# Patient Record
Sex: FEMALE | Race: WHITE | Employment: OTHER | URBAN - NONMETROPOLITAN AREA
[De-identification: names, ages, dates, MRNs, and addresses within clinical notes are randomized per-mention and may not be internally consistent; named-entity substitution may affect disease eponyms.]

---

## 2021-01-17 PROBLEM — S12.190A COMPRESSION FRACTURE OF SECOND CERVICAL VERTEBRA (HCC): Status: ACTIVE | Noted: 2021-01-17

## 2021-01-18 ENCOUNTER — HOSPITAL ENCOUNTER (INPATIENT)
Age: 86
LOS: 2 days | Discharge: NURSING, REHAB OR LONG-TERM CARE | DRG: 552 | End: 2021-01-20
Attending: ORTHOPAEDIC SURGERY | Admitting: ORTHOPAEDIC SURGERY
Payer: MEDICARE

## 2021-01-18 DIAGNOSIS — S12.190A COMPRESSION FRACTURE OF C2 VERTEBRA, INITIAL ENCOUNTER (HCC): Primary | ICD-10-CM

## 2021-01-18 LAB
ANION GAP SERPL CALCULATED.3IONS-SCNC: 11 MEQ/L (ref 8–16)
BUN BLDV-MCNC: 23 MG/DL (ref 7–22)
CALCIUM SERPL-MCNC: 8.8 MG/DL (ref 8.5–10.5)
CHLORIDE BLD-SCNC: 108 MEQ/L (ref 98–111)
CO2: 28 MEQ/L (ref 23–33)
CREAT SERPL-MCNC: 0.5 MG/DL (ref 0.4–1.2)
GFR SERPL CREATININE-BSD FRML MDRD: > 90 ML/MIN/1.73M2
GLUCOSE BLD-MCNC: 91 MG/DL (ref 70–108)
POTASSIUM REFLEX MAGNESIUM: 3.8 MEQ/L (ref 3.5–5.2)
SODIUM BLD-SCNC: 147 MEQ/L (ref 135–145)

## 2021-01-18 PROCEDURE — 2700000000 HC OXYGEN THERAPY PER DAY

## 2021-01-18 PROCEDURE — 51798 US URINE CAPACITY MEASURE: CPT

## 2021-01-18 PROCEDURE — 2580000003 HC RX 258: Performed by: PHYSICIAN ASSISTANT

## 2021-01-18 PROCEDURE — 6370000000 HC RX 637 (ALT 250 FOR IP): Performed by: PHYSICIAN ASSISTANT

## 2021-01-18 PROCEDURE — 80048 BASIC METABOLIC PNL TOTAL CA: CPT

## 2021-01-18 PROCEDURE — 94760 N-INVAS EAR/PLS OXIMETRY 1: CPT

## 2021-01-18 PROCEDURE — 1200000000 HC SEMI PRIVATE

## 2021-01-18 PROCEDURE — 99221 1ST HOSP IP/OBS SF/LOW 40: CPT | Performed by: PHYSICIAN ASSISTANT

## 2021-01-18 PROCEDURE — 36415 COLL VENOUS BLD VENIPUNCTURE: CPT

## 2021-01-18 RX ORDER — SODIUM CHLORIDE 0.9 % (FLUSH) 0.9 %
10 SYRINGE (ML) INJECTION EVERY 12 HOURS SCHEDULED
Status: DISCONTINUED | OUTPATIENT
Start: 2021-01-18 | End: 2021-01-20 | Stop reason: HOSPADM

## 2021-01-18 RX ORDER — HYDRALAZINE HYDROCHLORIDE 25 MG/1
25 TABLET, FILM COATED ORAL EVERY 6 HOURS PRN
Status: DISCONTINUED | OUTPATIENT
Start: 2021-01-18 | End: 2021-01-20 | Stop reason: HOSPADM

## 2021-01-18 RX ORDER — SODIUM CHLORIDE 9 MG/ML
INJECTION, SOLUTION INTRAVENOUS CONTINUOUS
Status: DISCONTINUED | OUTPATIENT
Start: 2021-01-18 | End: 2021-01-20 | Stop reason: HOSPADM

## 2021-01-18 RX ORDER — VALSARTAN 80 MG/1
80 TABLET ORAL DAILY
Status: DISCONTINUED | OUTPATIENT
Start: 2021-01-18 | End: 2021-01-20 | Stop reason: HOSPADM

## 2021-01-18 RX ORDER — PROMETHAZINE HYDROCHLORIDE 25 MG/1
12.5 TABLET ORAL EVERY 6 HOURS PRN
Status: DISCONTINUED | OUTPATIENT
Start: 2021-01-18 | End: 2021-01-20 | Stop reason: HOSPADM

## 2021-01-18 RX ORDER — HYDROCODONE BITARTRATE AND ACETAMINOPHEN 5; 325 MG/1; MG/1
1 TABLET ORAL EVERY 4 HOURS PRN
Status: DISCONTINUED | OUTPATIENT
Start: 2021-01-18 | End: 2021-01-20 | Stop reason: HOSPADM

## 2021-01-18 RX ORDER — ACETAMINOPHEN 325 MG/1
650 TABLET ORAL EVERY 4 HOURS PRN
Status: DISCONTINUED | OUTPATIENT
Start: 2021-01-18 | End: 2021-01-20 | Stop reason: HOSPADM

## 2021-01-18 RX ORDER — METOPROLOL TARTRATE 50 MG/1
50 TABLET, FILM COATED ORAL DAILY
COMMUNITY

## 2021-01-18 RX ORDER — POLYETHYLENE GLYCOL 3350 17 G/17G
17 POWDER, FOR SOLUTION ORAL DAILY PRN
Status: DISCONTINUED | OUTPATIENT
Start: 2021-01-18 | End: 2021-01-20 | Stop reason: HOSPADM

## 2021-01-18 RX ORDER — METOPROLOL TARTRATE 50 MG/1
50 TABLET, FILM COATED ORAL DAILY
Status: DISCONTINUED | OUTPATIENT
Start: 2021-01-18 | End: 2021-01-20 | Stop reason: HOSPADM

## 2021-01-18 RX ORDER — HYDROCODONE BITARTRATE AND ACETAMINOPHEN 5; 325 MG/1; MG/1
2 TABLET ORAL EVERY 4 HOURS PRN
Status: DISCONTINUED | OUTPATIENT
Start: 2021-01-18 | End: 2021-01-20 | Stop reason: HOSPADM

## 2021-01-18 RX ORDER — IPRATROPIUM BROMIDE AND ALBUTEROL SULFATE 2.5; .5 MG/3ML; MG/3ML
1 SOLUTION RESPIRATORY (INHALATION) EVERY 4 HOURS PRN
Status: DISCONTINUED | OUTPATIENT
Start: 2021-01-18 | End: 2021-01-20 | Stop reason: HOSPADM

## 2021-01-18 RX ORDER — ATORVASTATIN CALCIUM 40 MG/1
40 TABLET, FILM COATED ORAL NIGHTLY
Status: DISCONTINUED | OUTPATIENT
Start: 2021-01-18 | End: 2021-01-20 | Stop reason: HOSPADM

## 2021-01-18 RX ORDER — ATORVASTATIN CALCIUM 40 MG/1
40 TABLET, FILM COATED ORAL NIGHTLY
COMMUNITY

## 2021-01-18 RX ORDER — HYDROCHLOROTHIAZIDE 12.5 MG/1
12.5 CAPSULE, GELATIN COATED ORAL DAILY
Status: DISCONTINUED | OUTPATIENT
Start: 2021-01-18 | End: 2021-01-20 | Stop reason: HOSPADM

## 2021-01-18 RX ORDER — CELECOXIB 100 MG/1
100 CAPSULE ORAL 2 TIMES DAILY
COMMUNITY

## 2021-01-18 RX ORDER — CHLORAL HYDRATE 500 MG
3000 CAPSULE ORAL 3 TIMES DAILY
COMMUNITY

## 2021-01-18 RX ORDER — SODIUM CHLORIDE 0.9 % (FLUSH) 0.9 %
10 SYRINGE (ML) INJECTION PRN
Status: DISCONTINUED | OUTPATIENT
Start: 2021-01-18 | End: 2021-01-20 | Stop reason: HOSPADM

## 2021-01-18 RX ORDER — TIZANIDINE 4 MG/1
2 TABLET ORAL EVERY 6 HOURS PRN
Status: DISCONTINUED | OUTPATIENT
Start: 2021-01-18 | End: 2021-01-20 | Stop reason: HOSPADM

## 2021-01-18 RX ORDER — ASPIRIN 81 MG/1
81 TABLET ORAL DAILY
Status: DISCONTINUED | OUTPATIENT
Start: 2021-01-18 | End: 2021-01-20 | Stop reason: HOSPADM

## 2021-01-18 RX ORDER — ONDANSETRON 2 MG/ML
4 INJECTION INTRAMUSCULAR; INTRAVENOUS EVERY 6 HOURS PRN
Status: DISCONTINUED | OUTPATIENT
Start: 2021-01-18 | End: 2021-01-20 | Stop reason: HOSPADM

## 2021-01-18 RX ORDER — ASPIRIN 81 MG/1
81 TABLET ORAL DAILY
COMMUNITY

## 2021-01-18 RX ORDER — MORPHINE SULFATE 2 MG/ML
2 INJECTION, SOLUTION INTRAMUSCULAR; INTRAVENOUS
Status: DISCONTINUED | OUTPATIENT
Start: 2021-01-18 | End: 2021-01-20 | Stop reason: HOSPADM

## 2021-01-18 RX ORDER — MORPHINE SULFATE 4 MG/ML
4 INJECTION, SOLUTION INTRAMUSCULAR; INTRAVENOUS
Status: DISCONTINUED | OUTPATIENT
Start: 2021-01-18 | End: 2021-01-20 | Stop reason: HOSPADM

## 2021-01-18 RX ADMIN — METOPROLOL TARTRATE 50 MG: 50 TABLET, FILM COATED ORAL at 10:00

## 2021-01-18 RX ADMIN — ACETAMINOPHEN 650 MG: 325 TABLET ORAL at 10:25

## 2021-01-18 RX ADMIN — NALOXEGOL OXALATE 25 MG: 25 TABLET, FILM COATED ORAL at 10:00

## 2021-01-18 RX ADMIN — VALSARTAN 80 MG: 80 TABLET, FILM COATED ORAL at 10:00

## 2021-01-18 RX ADMIN — HYDROCHLOROTHIAZIDE 12.5 MG: 12.5 CAPSULE ORAL at 10:00

## 2021-01-18 RX ADMIN — SODIUM CHLORIDE: 9 INJECTION, SOLUTION INTRAVENOUS at 02:20

## 2021-01-18 NOTE — PROGRESS NOTES
Pt admitted to  7K11 via direct admit from [facility].     Complaints: C2 compression fracture, post fall. IV right forearm. Vital signs obtained. Assessment and data collection initiated. Two nurse skin assessment performed by Ollie Wakefield RN and Chuckie Yip. Oriented to room. Explained patients right to have family, representative or physician notified of their admission. Patient has Requested for physician to be notified. Patient has Requested for family/representative to be notified. Policies and procedures for 7 explained. All questions answered with no further questions at this time. Fall prevention and safety  discussed with patient. Bed alarm on. Call light in reach.

## 2021-01-18 NOTE — CONSULTS
Hospitalist Consult Note        Patient:  Kendra Sheridan  YOB: 1930  Date of Service: 1/18/2021  MRN: [id number]   Acct:  [de-identified]   Primary Care Physician: Bety Shea MD    Chief Complaint:  C2 compression fracture   Reason for consult  Medical management    Date of Service: Pt seen/examined in consultation on 1/18/2021     History Of Present Illness:      Reyesdie[age] Jur[age] y.o. female who we are asked to see/evaluate by Rd Burk MD for medical management of atrial fibrillation, essential hypertension, and CVA. Patient transferred from [facility] with a C2 compression fracture that occurred as a result of a mechanical fall in the bathroom at home. The patient lives independently with her  of 59 years at home. The patient's  dose advise she had a stroke about one year ago and has significant memory issues. Patient denies any upper extremity pain or weakness, no other symptoms at this time. Assessment and Plan:-  1. C2 Compression fracture - primary to manage  2. Atrial fibrillation - rate controlled, anticoagulation is held for potential procedure  3. Essential hypertension - continue home medications  4. CVA - on statin, plavix --> currently held, ASA --> currently held, stable      Past Medical History:        Diagnosis Date    Arthritis     Atrial fibrillation ([address]     Breast cancer ([address]     Cerebral artery occlusion with cerebral infarction ([address]     [facility] mellitus [facility]     Hx of blood clots     Hyperlipidemia     Hypertension        Past Surgical History:        Procedure Laterality Date    BREAST SURGERY      JOINT REPLACEMENT      right hip    JOINT REPLACEMENT      bilateral knees    MASTECTOMY Left        Home Medications:   No current facility-administered medications on file prior to encounter.       Current Outpatient Medications on File Prior to Encounter   Medication Sig Dispense Refill    celecoxib (CELEBREX) 100 MG capsule Take 100 mg by mouth 2 times daily      naloxegol (MOVANTIK) 25 MG TABS tablet Take 25 mg by mouth every morning      Valsartan (DIOVAN PO) Take 1 tablet by mouth 80/12.5 mg      Apixaban (ELIQUIS PO) Take 5 mg by mouth 2 times daily      aspirin EC 81 MG EC tablet Take 81 mg by mouth daily      atorvastatin (LIPITOR) 40 MG tablet Take 40 mg by mouth nightly      metoprolol tartrate (LOPRESSOR) 50 MG tablet Take 50 mg by mouth daily      Omega-3 Fatty Acids (FISH OIL) 1000 MG CAPS Take 3,000 mg by mouth 3 times daily      Multiple Vitamins-Minerals (MULTIVITAMIN ADULT PO) Take by mouth      calcium-vitamin D (OSCAL) 250-125 MG-UNIT per tablet Take 2 tablets by mouth daily      Psyllium (METAMUCIL PO) Take by mouth         Allergies:    Patient has no known allergies. Social History:    reports that she has never smoked. She has never used smokeless tobacco.    Family History:   No family history on file. Diet:  DIET GENERAL;    Review of systems:   Pertinent positives as noted in the HPI. All other systems reviewed and negative. PHYSICAL EXAM:  BP (!) 170/78   Pulse 102   Temp 98.8 °F (37.1 °C) (Oral)   Resp (!) 32   Ht 5' 8\" (1.727 m)   Wt 210 lb 8 oz (95.5 kg)   SpO2 98%   BMI 32.01 kg/m²   General appearance: No apparent distress, appears stated age and cooperative. HEENT: Normal cephalic, ecchymosis over the right eye and forehead without obvious deformity. Pupils equal, round, and reactive to light. Extra ocular muscles intact. Conjunctivae/corneas clear. Neck: Supple, in EMS cervical collar --> changed to [address] with resource nurse. No jugular venous distention. Trachea midline. Respiratory:  Normal respiratory effort. Clear to auscultation, bilaterally without Rales/Wheezes/Rhonchi. Cardiovascular: irregularly irregular with normal S1/S2 without murmurs, rubs or gallops. Abdomen: Soft, non-tender, non-distended with normal bowel sounds.   Musculoskeletal:  No clubbing, cyanosis or

## 2021-01-18 NOTE — CARE COORDINATION
DISCHARGE/PLANNING EVALUATION  1/18/21, 3:26 PM EST    Reason for Referral: home health  Mental Status: alert, oriented   Decision Making:  Makes own decisions with family support   Family/Social/Home Environment:  Austin Chapa lives at home with her . Family is planning for Austin Chapa to return home at discharge. Their son assists at home as needed   Current Services including food security, transportation and housekeeping:  No current services, no concerns with food security, housekeeping or transportation identified by family   Current Equipment:   Payment Source: medicare   Concerns or Barriers to Discharge: requesting [facility], will need [facility] orders   Post acute provider list with quality measures, geographic area and applicable managed care information provided. Questions regarding selection process answered: declined list, prefers [facility]    Teach Back Method used with  and son regarding care plan and discharge plan  Patient's  and son verbalize understanding of the plan of care and contribute to goal setting. Patient goals, treatment preferences and discharge plan:  Spoke with Umu's  and son. Austin Chapa was sleeping through most of discussion.   Family plans home at discharge, requests [facility]    Electronically signed by KEVIN Mujica on 1/18/2021 at 3:26 PM

## 2021-01-18 NOTE — PROCEDURES
A Bladder scan was performed at 0400 . The patient's last void was at unknown per patient but she doesn't feel like she has to either . The residual amount was measured to be 167 ML.   Report of results was given to [facility]

## 2021-01-18 NOTE — PLAN OF CARE
Problem: Falls - Risk of:  Goal: Will remain free from falls  Description: Will remain free from falls  Outcome: Ongoing  Goal: Absence of physical injury  Description: Absence of physical injury  Outcome: Ongoing     Problem: Skin Integrity:  Goal: Will show no infection signs and symptoms  Description: Will show no infection signs and symptoms  Outcome: Ongoing  Goal: Absence of new skin breakdown  Description: Absence of new skin breakdown  Outcome: Ongoing     Problem: DISCHARGE BARRIERS  Goal: Patient's continuum of care needs are met  Outcome: Ongoing    assisting with discharge planning.

## 2021-01-18 NOTE — FLOWSHEET NOTE
01/18/21 1301   Encounter Summary   Services provided to: Patient and family together   Referral/Consult From: [address]; Children   Continue Visiting Yes  (1/18)   Complexity of Encounter Moderate   Length of Encounter 15 minutes   Spiritual Assessment Completed Yes   Advance Care Planning Yes   Routine   Type Initial   Assessment Calm; Approachable   Intervention Nurtured hope   Outcome Comfort;Expressed gratitude   Advance Directives (For Healthcare)   Healthcare Directive Yes, patient has an advance directive for healthcare treatment   Assessment: In my encounter with the 80 yr old patient, while rounding  the unit 7K,  I provided spiritual care to patient through conversation, I also came to verify if the patient had a Advance Directive on file. Interventions:  I provided prayer, emotional support and words of comfort. Through our conversation the  pt did not have a Advance Directive on file at this time. The pt stated that she completed a Advance Directive about a year ago and it was at home. I urged the pt's  to bring it in so that it could be copied and scanned into the system. Outcomes: The patient was encouraged and didnt share any further spiritual needs at this time. The pt remains optimistic and hopeful. The pt shared that they were appreciative for the support. Plan:  1. Chaplains will follow-up at a later time for assessment of any spiritual care needs present. 2. At a later time the patient the pt might be interested in Advance Directive information.

## 2021-01-18 NOTE — PROGRESS NOTES
H&P Dictated. Comminuted type III dens fracture, not significantly displaced. Will proceed conservatively and have the patient remain in the C-Collar. She will begin working with PT/OT. No surgical intervention planned at this time. Will repeat CT CS in approximately 1 week to verify fx displacement is not worsening.

## 2021-01-18 NOTE — PROGRESS NOTES
BRANDO Neal and Lucho Owen RN changed patient to [employer] collar.

## 2021-01-19 LAB
BASOPHILS # BLD: 0.1 %
BASOPHILS ABSOLUTE: 0 THOU/MM3 (ref 0–0.1)
EOSINOPHIL # BLD: 3.3 %
EOSINOPHILS ABSOLUTE: 0.2 THOU/MM3 (ref 0–0.4)
ERYTHROCYTE [DISTWIDTH] IN BLOOD BY AUTOMATED COUNT: 13.6 % (ref 11.5–14.5)
ERYTHROCYTE [DISTWIDTH] IN BLOOD BY AUTOMATED COUNT: 52.5 FL (ref 35–45)
HCT VFR BLD CALC: 37.9 % (ref 37–47)
HEMOGLOBIN: 11.8 GM/DL (ref 12–16)
IMMATURE GRANS (ABS): 0.01 THOU/MM3 (ref 0–0.07)
IMMATURE GRANULOCYTES: 0.1 %
LYMPHOCYTES # BLD: 24.6 %
LYMPHOCYTES ABSOLUTE: 1.6 THOU/MM3 (ref 1–4.8)
MCH RBC QN AUTO: 32.9 PG (ref 26–33)
MCHC RBC AUTO-ENTMCNC: 31.1 GM/DL (ref 32.2–35.5)
MCV RBC AUTO: 105.6 FL (ref 81–99)
MONOCYTES # BLD: 12.1 %
MONOCYTES ABSOLUTE: 0.8 THOU/MM3 (ref 0.4–1.3)
NUCLEATED RED BLOOD CELLS: 0 /100 WBC
PLATELET # BLD: 130 THOU/MM3 (ref 130–400)
PMV BLD AUTO: 10.9 FL (ref 9.4–12.4)
RBC # BLD: 3.59 MILL/MM3 (ref 4.2–5.4)
SEG NEUTROPHILS: 59.8 %
SEGMENTED NEUTROPHILS ABSOLUTE COUNT: 4 THOU/MM3 (ref 1.8–7.7)
WBC # BLD: 6.7 THOU/MM3 (ref 4.8–10.8)

## 2021-01-19 PROCEDURE — 6370000000 HC RX 637 (ALT 250 FOR IP): Performed by: PHYSICIAN ASSISTANT

## 2021-01-19 PROCEDURE — 97530 THERAPEUTIC ACTIVITIES: CPT

## 2021-01-19 PROCEDURE — 36415 COLL VENOUS BLD VENIPUNCTURE: CPT

## 2021-01-19 PROCEDURE — 97163 PT EVAL HIGH COMPLEX 45 MIN: CPT

## 2021-01-19 PROCEDURE — 97110 THERAPEUTIC EXERCISES: CPT

## 2021-01-19 PROCEDURE — 2700000000 HC OXYGEN THERAPY PER DAY

## 2021-01-19 PROCEDURE — 97166 OT EVAL MOD COMPLEX 45 MIN: CPT

## 2021-01-19 PROCEDURE — 92526 ORAL FUNCTION THERAPY: CPT

## 2021-01-19 PROCEDURE — 94760 N-INVAS EAR/PLS OXIMETRY 1: CPT

## 2021-01-19 PROCEDURE — 85025 COMPLETE CBC W/AUTO DIFF WBC: CPT

## 2021-01-19 PROCEDURE — 2580000003 HC RX 258: Performed by: PHYSICIAN ASSISTANT

## 2021-01-19 PROCEDURE — 1200000000 HC SEMI PRIVATE

## 2021-01-19 PROCEDURE — 92610 EVALUATE SWALLOWING FUNCTION: CPT

## 2021-01-19 PROCEDURE — 97535 SELF CARE MNGMENT TRAINING: CPT

## 2021-01-19 RX ORDER — TIZANIDINE 2 MG/1
2 TABLET ORAL EVERY 6 HOURS PRN
Qty: 50 TABLET | Refills: 0 | Status: SHIPPED | OUTPATIENT
Start: 2021-01-19

## 2021-01-19 RX ORDER — HYDROCODONE BITARTRATE AND ACETAMINOPHEN 5; 325 MG/1; MG/1
1 TABLET ORAL EVERY 4 HOURS PRN
Qty: 42 TABLET | Refills: 0 | Status: SHIPPED | OUTPATIENT
Start: 2021-01-19 | End: 2021-01-26

## 2021-01-19 RX ADMIN — ATORVASTATIN CALCIUM 40 MG: 40 TABLET, FILM COATED ORAL at 20:32

## 2021-01-19 RX ADMIN — ACETAMINOPHEN 650 MG: 325 TABLET ORAL at 12:15

## 2021-01-19 RX ADMIN — SODIUM CHLORIDE: 9 INJECTION, SOLUTION INTRAVENOUS at 00:26

## 2021-01-19 RX ADMIN — NALOXEGOL OXALATE 25 MG: 25 TABLET, FILM COATED ORAL at 08:17

## 2021-01-19 RX ADMIN — ACETAMINOPHEN 650 MG: 325 TABLET ORAL at 17:22

## 2021-01-19 RX ADMIN — HYDROCHLOROTHIAZIDE 12.5 MG: 12.5 CAPSULE ORAL at 08:17

## 2021-01-19 RX ADMIN — METOPROLOL TARTRATE 50 MG: 50 TABLET, FILM COATED ORAL at 08:17

## 2021-01-19 RX ADMIN — HYDROCODONE BITARTRATE AND ACETAMINOPHEN 1 TABLET: 5; 325 TABLET ORAL at 05:28

## 2021-01-19 RX ADMIN — VALSARTAN 80 MG: 80 TABLET, FILM COATED ORAL at 08:17

## 2021-01-19 RX ADMIN — SODIUM CHLORIDE, PRESERVATIVE FREE 10 ML: 5 INJECTION INTRAVENOUS at 22:00

## 2021-01-19 ASSESSMENT — PAIN SCALES - GENERAL
PAINLEVEL_OUTOF10: 0
PAINLEVEL_OUTOF10: 3
PAINLEVEL_OUTOF10: 7

## 2021-01-19 ASSESSMENT — PAIN DESCRIPTION - PAIN TYPE: TYPE: ACUTE PAIN

## 2021-01-19 ASSESSMENT — PAIN DESCRIPTION - LOCATION: LOCATION: HEAD

## 2021-01-19 ASSESSMENT — PAIN DESCRIPTION - ORIENTATION: ORIENTATION: LEFT

## 2021-01-19 NOTE — DISCHARGE INSTR - DIET

## 2021-01-19 NOTE — CARE COORDINATION
DISASTER CHARTING    1/19/21, 2:02 PM EST    DISCHARGE ONGOING EVALUATION:     [facility]. day: 1  Location: -11/011-A Reason for admit: Compression fracture of C2 vertebra, initial encounter [facility]) [S12.190A]   Barriers to Discharge: Patient transferred from [facility] with a C2 compression fracture that occurred as a result of a mechanical fall in the bathroom at home. History of CVA. C2 fx with [place] collar in place. Neuro checks. Pain management. Monitor labs and VS.  Hospitalist for medical management. PT/OT. PCP: Ray Palomino MD  Readmission Risk Score: 7%  Patient Goals/Plan/Treatment Preferences: Umu and her  were hoping to go home with [place]. She has needed equipment for home use. Home might not be safe plan for discharge. May need more assistance.  and son considering ECF. [facility] is their [facility] choice. See SW notes - [facility].

## 2021-01-19 NOTE — PROGRESS NOTES
Call made to [facility]SAY CARE[facility] jayda BLOOD regarding removing front of collar while eating. Per [facility]INDSAY CARE[facility] JOSY, family states she had difficulty eating with collar on yesterday. Approval received from dr Zheng Leon that pt may remove front of collar only while eating if unable to eat adequately. Informed with pts memory issues, concern for not holding head still. Connor Riddle voices if pt does remove front of collar to eat, pt must hold head still. Informed speech has seen pt and pt passed her swallowing eval with collar on. Connor Riddle states if pt is eating adequately and family is comfortable pt can leave collar on with eating.  and son given update on above.

## 2021-01-19 NOTE — PROGRESS NOTES
[address] [address]  INPATIENT PHYSICAL THERAPY  EVALUATION  [facility] 7K - 7K-11/011-A    Time In: 8156  Time Out: 0105  Timed Code Treatment Minutes: 30 Minutes  Minutes: 45          Date: 2021  Patient Name: Susan Asif,  Gender:  female        MRN: [id number]  : 10/24/1930  (80 y.o.)      Referring Practitioner: Savanah Jackson PA-C  Diagnosis: compression fracture of C2 vertebra, initial encounter  Additional Pertinent Hx: Per chart review: Mon[age] Hernandes y.o. female who we are asked to see/evaluate by Zofia Magaña MD for medical management of atrial fibrillation, essential hypertension, and CVA. Patient transferred from [facility] with a C2 compression fracture that occurred as a result of a mechanical fall in the bathroom at home. The patient lives independently with her  of 59 years at home. The patient's  dose advise she had a stroke about one year ago and has significant memory issues. Patient denies any upper extremity pain or weakness, no other symptoms at this time. Restrictions/Precautions:  Restrictions/Precautions: General Precautions  Required Braces or Orthoses  Cervical: c-collar  Position Activity Restriction  Spinal Precautions: No Bending, No Lifting, No Twisting  Other position/activity restrictions: Comminuted type III dens fracture, not significantly displaced. Subjective:  Chart Reviewed: Yes  Patient assessed for rehabilitation services?: Yes  Family / Caregiver Present: Yes(son-in-law and )  Subjective: Rn approved PT evalutation. UPon entry, pt supine in bed, pleasant and agreeable to participate in therapy. Pt  and son-in-law also present and very engaged in pt care, asking questions throughout. Post session, pt returned to supine in bed with bed alarm on and all needs within reach.     General:  Overall Orientation Status: Within Functional Limits  Follows Commands: Within Functional Limits    Vision: Impaired  Vision Exceptions: Wears glasses at all times    Hearing: Within functional limits         Pain: 7/10: HA at rest in bed upon entry. 8/10 with bed flat following donning/doffign c-collar, 10/10 sitting EOB    Social/Functional History:    Lives With: Spouse  Type of Home: House  Home Layout: One level  Home Access: Stairs to enter without rails  Entrance Stairs - Number of Steps: 1  Home Equipment: Rolling walker, 4 wheeled walker, Cane             ADL Assistance: Needs assistance  Homemaking Assistance: Needs assistance  Ambulation Assistance: Independent  Transfer Assistance: Independent          Additional Comments: reports being indep with mobility. uses RW for all household ambulation.  performs most cooking and cleaning and assists with dressing. Pt indep with bathing    OBJECTIVE:  Range of Motion:  Bilateral Lower Extremity: WFL    Strength:  Bilateral Lower Extremity: grossly deconditioned, formal MMT not assessed due to decreased sitting tolerance. Pt able to perform all supine therex without assist    Balance:  Static Sitting Balance:  Contact Guard Assistance, pt tolerated ~2 min static sitting EOB before reporting 10/10 HA and requesting to return to supine    Bed Mobility:  Rolling to Left: Moderate Assistance, X 1, with verbal cues , with increased time for completion   Supine to Sit: Moderate Assistance, X 1, with verbal cues , with increased time for completion, assist at B LE and trunk  Sit to Supine: Moderate Assistance, X 1, with increased time for completion, assist at B LE and trunk    *log roll utilized, HOB flat    Transfers:  Not Tested    Ambulation:  Not Tested    Exercise:  Patient was guided in 1 set(s) 10 reps of exercise to both lower extremities. Ankle pumps, Glut sets, Quad sets, Heelslides and Hip abduction/adduction. Exercises were completed for increased independence with functional mobility.     Functional Outcome Measures: Completed  AM-[facility] Inpatient Mobility Raw Score : 8  AM-[facility] Inpatient T-Scale Score : 28.52    ASSESSMENT:  Activity Tolerance:  Patient tolerance of  treatment: fair. Pt anxious to perform mobility, limited by increased HA sitting EOB      Treatment Initiated: Treatment and education initiated within context of evaluation. Evaluation time included review of current medical information, gathering information related to past medical, social and functional history, completion of standardized testing, formal and informal observation of tasks, assessment of data and development of plan of care and goals. Treatment time included skilled education and facilitation of tasks to increase safety and independence with functional mobility for improved independence and quality of life. *demonstration provided to  and son-in-law on donning/doffing c-collar with pt in supine with HOB flat. Tech stabilizing cervical spine. Significant education provided on c-collar positioning, fit, and collar pad care with HO provided. All questions from family answered. Initiated supine therex. Education provided on why it is necessary to maintain neutral cervical spine due to location of fracture. Assessment: Body structures, Functions, Activity limitations: Decreased functional mobility , Decreased strength, Decreased endurance, Decreased balance, Increased pain, Decreased ROM  Assessment: Pt presents with compression fracture of C2. Demonstrates a decrease from baseline functional mobility of bed mobility. Unable to asses transfers and ambulation this date due to increased pain and HA. Is anxious to perform all mobility and is limited by pain. Requires c-collar at all times. Pt to benefit from skilled PT services during admission and post d/c to promote increased indep with functional mobility activities for return to PLOF.   Prognosis: Good    REQUIRES PT FOLLOW UP: Yes    Discharge Recommendations:  Discharge Recommendations: Continue to assess pending progress, Patient would benefit from continued therapy after discharge    Patient Education:  PT Education: Goals, PT Role, Home Exercise Program, Plan of Care, Precautions, General Safety, Functional Mobility Training, Family Education  Patient Education: Extensive education provided to pt and pt family on c-collar, proper positioning and fit, how to don/doff in supine, to ensure pt maintains neutral spine during donning/doffing and to have second assist to stabilize cervical spine, proper care of c-collar pads. Extra set of c-collar pads provided. Education provided on log roll technique for bed mobility. Equipment Recommendations:  Equipment Needed: No    Plan:  Times per week: 6x O/Spine  Current Treatment Recommendations: Strengthening, ROM, Balance Training, Functional Mobility Training, Home Exercise Program, Safety Education & Training, Patient/Caregiver Education & Training    Goals:  Patient goals : to return home  Short term goals  Time Frame for Short term goals: by discharge  Short term goal 1: Pt to perform rolling in bed with SBA to promote ease of bed mobility. Short term goal 2: Pt to perform supine<>sit with SBA to promote ease of getting in and out of bed. Short term goal 3: Pt to tolerate sitting EOB for at least 5 min in prep for transfers and OOB activity. Short term goal 4: PT to assess transfers and gait  Long term goals  Time Frame for Long term goals : n/a due to short ELOS    Following session, patient left in safe position with all fall risk precautions in place.

## 2021-01-19 NOTE — CARE COORDINATION
1/19/21, 10:32 AM EST    DISCHARGE PLANNING EVALUATION      Referral completed to [facility] AT [place]. Planning home with family and Emilee Donjose g Erik Richard today. 1/19/21, 10:33 AM EST    Patient goals/plan/ treatment preferences discussed by  and . Patient goals/plan/ treatment preferences reviewed with patient/ family. Patient/ family verbalize understanding of discharge plan and are in agreement with goal/plan/treatment preferences. Understanding was demonstrated using the teach back method. AVS provided by RN at time of discharge, which includes all necessary medical information pertaining to the patients current course of illness, treatment, post-discharge goals of care, and treatment preferences.     Services After Discharge  Services At/After Discharge: PT, OT, Nursing Services, Aide Services([facility])   IMM Letter  IMM Letter given to Patient/Family/Significant other/Guardian/POA/by[de-identified] staff  IMM Letter date given[de-identified] 01/18/21  IMM Letter time given[de-identified] 3714

## 2021-01-19 NOTE — PROGRESS NOTES
Department of Orthopedic Surgery  Spine Service  Progress Note        Subjective:   [birth year]  Donavon Tabor is resting in bed with C-Collar in place. Evaluated by myself and Dr. Melissa Colbert and discussed with patients  and son regarding plan of treatment. Will continue C-Collar. Begin working with PT/OT to determine if patient cleared to go home. If ok to go home, will discharge today. Have patient ambulate and sit up for meal. With meals, please take off front of collar to avoid choking hazard. Will f/u with the patient in the OP to repeat CT CS. Neuro intact. Patient does have difficulty with short term memory. Vitals  VITALS:  BP (!) 156/87   Pulse 80   Temp 98.4 °F (36.9 °C) (Oral)   Resp 16   Ht 5' 8\" (1.727 m)   Wt 210 lb 8 oz (95.5 kg)   SpO2 95%   BMI 32.01 kg/m²   24HR INTAKE/OUTPUT:    Intake/Output Summary (Last 24 hours) at 1/19/2021 0711  Last data filed at 1/19/2021 0525  Gross per 24 hour   Intake 3398.76 ml   Output 400 ml   Net 2998.76 ml     URINARY CATHETER OUTPUT ([place]):  Urethral Catheter-Output (mL): 0 mL  External Urinary Catheter-Output (mL): 100 mL  DRAIN/TUBE OUTPUT:     VENT SETTINGS:  Vent Information  SpO2: 95 %  Additional Respiratory  Assessments  Pulse: 80  Resp: 16  SpO2: 95 %        PHYSICAL EXAM:    Orientation:  Not alert and oriented to person, place and time    Upper Extremity Motor :  deltoids/biceps/triceps/wirst flexion/wrist extension/finger flexion/finger extension 5/5 bilaterally  Upper Extremity Sensory:  Intact C3-T1 distribution    Lower Extremity Motor :  quadriceps, extensor hallucis longus, dorsiflexion, plantarflexion 5/5 bilaterally  Lower Extremity Sensory:  Intact L1-S1    ABNORMAL EXAM FINDINGS:  none    LABS:  Recent Labs     01/19/21  0511   HGB 11.8*   HCT 37.9       ASSESSMENT AND PLAN:    Comminuted C2 L lateral mass fx, stable    1:  Monitor labs  2:  Activity Level:  OOB with therapy and staff.  PT/OT eval to verify if patient able to be safely discharge  3:  Pain Control:  controlled  4:  Discharge Planning:  Potentially today pending PT/OT eval    Electronically signed by Teena Aden PA-C on 1/19/2021 at 7:07 AM

## 2021-01-19 NOTE — PROGRESS NOTES
[address] [facility]  Bedside Swallowing Evaluation + Dysphagia tx       SLP Individual Minutes  Time In: 0215  Time Out: 1020  Minutes: 24  Timed Code Treatment Minutes: 0 Minutes      BSE: 16 min   Dysphagia tx: 8 min     Date: 2021  Patient Name: Serge Olmos      CSN: [id number]   : 10/24/1930  (80 y.o.)  Gender: female   Referring Physician: Natalia Templeton PA-C  Diagnosis: Compression fracture of C2  Secondary Diagnosis: Dysphagia    History of Present Illness/Injury: Per chart review, Serge Olmos is a 80 y.o. female who we are asked to see/evaluate by Mike Simmons MD for medical management of atrial fibrillation, essential hypertension, and CVA. Patient transferred from [facility] with a C2 compression fracture that occurred as a result of a mechanical fall in the bathroom at home. The patient lives independently with her  of 59 years at home. The patient's  dose advise she had a stroke about one year ago and has significant memory issues. Patient denies any upper extremity pain or weakness, no other symptoms at this time. ST was consulted this date to complete clinical swallow evaluation and determine safest level of po intake and/or need for further instrumental evaluation. Past Medical History:   Diagnosis Date    Arthritis     Atrial fibrillation ([address]     [facility]     Cerebral artery occlusion with cerebral infarction ([facility]     Diabetes mellitus ([facility]     Hx of blood clots     Hyperlipidemia     Hypertension        SUBJECTIVE: Cinthya DAVIES, approved session this date. Prior to entering room, Simone DAVIES voiced concerns re: removal of c-collar for po intake d/t C2 fracture and pt with noted confusion. Per discussion with RN, this ST is in agreement with maintaining placement of c-collar to decrease risk of displacement of C2 fracture.  Upon arrival to room, pt sleeping in bed with  and son present at bedside.  and son provided background information regarding swallow issues over the past 24 hours. Pt easily awakened to name and was agreeable to participate in skilled ST evaluation. Pt was alert and cooperative throughout evaluation. OBJECTIVE:    Pain:  No pain reported at beginning of evaluation; however, as session concluded pt reported pain on left side of neck d/t c-collar. ST informed RNJennifer of pain immediately following evaluation. Current Diet: General and thins    Respiratory Status:  Nasal Canula    Behavioral Observation:  Alert    Oral Mechanism Evaluation:      Facial / Labial WFL    Lingual WFL    Dentition WFL    Velum Not Tested Difficult to visualize d/t reduced jaw ROM d/t c-collar placement   Vocal Quality [facility]    Sensation Not Tested    Cough Not Tested      Patient Evaluated Using:  Ice chips, thins via teaspoon and straw, puree, soft texture, and hard/coarse texture. Oral Phase:  Impaired:  Impaired Mastication    Pharyngeal Phase: Impaired: Audible Swallow    Signs and Symptoms of Laryngeal Penetration/Aspiration: No signs/symptoms of aspiration evident in this evaluation, but cannot rule out silent aspiration. Impresssions: Pt demonstrated evidence of a mild oral dysphagia characterized by impaired mastication d/t reduced jaw ROM secondary to c-collar placement. Pt demonstrated evidence of a suspected mild pharyngeal dysphagia characterized by delayed swallow d/t audible swallow noted following trials of thin liquids via straw; however, no overt s/s of aspiration or penetration were noted with po trials completed this date. Pt does not report any pain or difficulty swallowing this date. Pt with evidenced of good bolus formation, good bolus control, timely ap transit, and good bolus clearance with no oral residue following all trials this date.  It is recommended pt initiate a soft and bite-size diet with thin liquids (okay for straws) at this time with implementation of recommended swallow strategies (small bites/sips, slow rate, upright position, alternation of bites/sips, and 1:1 assistance with all po intake). RECOMMENDATIONS/ASSESSMENT:   Modified Barium Swallow:  MBS is not indicated at this time. Will recommend as appropriate  Diet Recommendations:  Soft and Bite-Size  Strategies:  Full Upright Position, Small Bite/Sip, Alternate Solids and Liquids, Monitor for Fatigue and slow rate   Rehabilitation Potential: good    EDUCATION:  Learner: Patient  Education:  Reviewed results and recommendations of this evaluation, Reviewed diet and strategies, Reviewed signs, symptoms and risks of aspiration, Reviewed ST goals and Plan of Care, Education Related to Potential Risks and Complications Due to Impairment/Illness/Injury and Education Related to Prevention of Recurrence of Impairment/Illness/Injury  Evaluation of Education: Verbalizes understanding, Demonstrates with assistance and Needs further instruction    PLAN:  Skilled SLP intervention on acute care 3-5 x per week or until goals met and/or pt plateaus in function. Specific interventions for next session may include: review of swallow strategies and skilled dietary analysis . PATIENT GOAL:    Did not state. Will further assess during treatment. SHORT TERM GOALS:  Short-term Goals  Timeframe for Short-term Goals: 2 weeks  Goal 1: Pt will consume a soft and bite-size diet and thin liquids with utilization of swallow strategies without overt s/s of aspiration, adequate mastication, timely bolus formation, and adequate endurace for safe meal consumption. INTERVENTION: ST reviewed results and recommendations of clinical swallow evaluation with  and son. ST reviewed recommended swallow strategies (upright position, small bites/sips, slow rate (1 bite at a time), alternation of bites/sips, and 1:1 assistance) and diet recommendations (soft and bite-size with thin liquids - okay for straw).  ST educated son and  on s/s of aspiration, risks associated with aspiration, and what to do if pt begins to demonstrate overt s/s of aspiration during meals.  and son reported understanding and no further questions at this time. Goal 2: Pt will participate in advanced po trials without overt s/s of aspiration, timely mastication, good bolus formation, timely swallow initiation, and adequate endurance for potential upgrade to least restrictive diet. INTERVENTION: not addressed this date d/t focus on other goals     Goal 3: Pt will complete further instrumental evaluation should overt s/s of aspiration arise with po intake of current diet level.   INTERVENTION: not addressed this date d/t focus on other goals     LONG TERM GOALS:  No long term goals recommended d/t short [address]

## 2021-01-19 NOTE — PROGRESS NOTES
Antoniosymone Adilson 60  INPATIENT OCCUPATIONAL THERAPY  [facility] ORTHOPEDICS 7K  EVALUATION    Time:    Time In: 6371  Time Out: 1228  Timed Code Treatment Minutes: 23 Minutes  Minutes: 35          Date: 2021  Patient Name: Ritika Aldrich,   Gender: female      MRN: [id number]  : 10/24/1930  (80 y.o.)  Referring Practitioner: Maranda Olmos PA-C  Diagnosis: compression fracute of C2  Additional Pertinent Hx: Puiowy89 y.o. female who we are asked to see/evaluate by Dwight Villafuerte MD for medical management of atrial fibrillation, essential hypertension, and CVA. Patient transferred from [facility] with a C2 compression fracture that occurred as a result of a mechanical fall in the bathroom at home. The patient lives independently with her  of 59 years at home. The patient's  dose advise she had a stroke about one year ago and has significant memory issues. Ortho treating C2 fracture conservatively at this time. Restrictions/Precautions:  Restrictions/Precautions: General Precautions  Required Braces or Orthoses  Cervical: c-collar  Position Activity Restriction  Spinal Precautions: No Bending, No Lifting, No Twisting  Other position/activity restrictions: Comminuted type III dens fracture, not significantly displaced. Subjective  Chart Reviewed: Yes, Orders, Progress Notes, History and Physical  Patient assessed for rehabilitation services?: Yes    Subjective: Pt awake lying in bed wth son and spouse present. Pt agreeable to participating in OT session.     Pain:  Pain Assessment  Patient Currently in Pain: Yes  Pain Assessment: 0-10  Pain Level: 10(when siting EOB or on BSC)  Pain Type: Acute pain  Pain Location: Head  Pain Orientation: Left  Pain Descriptors: Aching  Pain Frequency: Continuous    Social/Functional History:  Lives With: Spouse  Type of Home: House  Home Layout: One level  Home Access: Stairs to enter without rails  Entrance Stairs - Number of Steps: 1  Home Equipment: Rolling walker, 4 wheeled walker, Cane   Bathroom Toilet: Standard(with BSC placed over toilet)  Bathroom Equipment: 3-in-1 commode  Bathroom Accessibility: Accessible       ADL Assistance: Needs assistance  Homemaking Assistance: Needs assistance  Ambulation Assistance: Independent  Transfer Assistance: Independent          Additional Comments: reports being indep with mobility. uses RW for all household ambulation.  performs most cooking and cleaning and assists with dressing. Pt indep with bathing    VISION:WNL pt stating sh could see therapist with no complaints of visual deficits. Pt tended to keep her eyes closed majority of time requiring uces to open    HEARING:  WNL    COGNITION: Decreased Recall, Impaired Memory, Decreased Problem Solving and Decreased Safety Awareness    RANGE OF MOTION:  Bilateral Upper Extremity:  pt demo shoulder flexion of right sholder approx 120 degrees with left shoulder flexion approx 90 degrees    STRENGTH:  Bilateral Upper Extremity:  bilateral UE general weakness and deconditioning    SENSATION:   WFL    ADL:   Lower Extremity Dressing: Maximum Assistance. donning socks  Toileting: Maximum Assistance. for hygiene  Toilet Transfer: Minimal Assistance. from [facility]. Recommended to spouse to have a BSC to place closer to her chair for toileting tasks d/t decrease tolerance with activity. Spouse stating he does have one at home. BALANCE:  Sitting Balance:  Contact Guard Assistance. at EOB with 1 instance of max A when pt sudenly leaned to her right side d/t pain on her left side of head   Standing Balance: Contact Guard Assistance. at walker    BED MOBILITY:  Supine to Sit: Moderate Assistance  pt having incrased pain with rolling onto left side    TRANSFERS:  Sit to Stand:  Contact Guard Assistance. from EOB with cues for handplacement  Stand to Sit: Minimal Assistance.  into bedside radha with cues for safety to keep alker with her and turn all the way aorund FUNCTIONAL MOBILITY:  Assistive Device: Rolling Walker  Assist Level:  Contact Guard Assistance. Distance: from bed to [facility] to chair  Pt unsteady but no LOB requiring assistance. Pt requiring cues for safety. Spouse present during for education on safety with pts mobility       Spoke with son and RN present during in regards to pts ability to return home with spouse today. Son reports he feels that it was best for pt to return home today and felt pt and spouse would be ok. Spouse educated on postioning of pt while seated in chair with head supported which was where pt currently was with no complaints of pain during. Son educated on use of [facility] as well to alleviate the mobility to/from bathroom until pt can tolerate futher activity. Son verbalized understanding of all and asked appropriate questions of his own as well. Activity Tolerance:  Patient tolerance of  treatment: fair. Increased pain with sitting at EOB or BSC unsupported. Assessment:  Assessment: Pt s/p fall rsulting in C2 fracture with brace in place. Pt with significant pain when sitting on EOB or BSC unsupported at head. pT with decreased tolerance to activity d/t pain equiring increased assistance with ADL tasks and need for continued skilled OT Services. Performance deficits / Impairments: Decreased functional mobility , Decreased safe awareness, Decreased balance, Decreased ADL status, Decreased endurance, Decreased strength  Prognosis: Fair  REQUIRES OT FOLLOW UP: Yes  Decision Making: Medium Complexity    Treatment Initiated: Treatment and education initiated within context of evaluation. Evaluation time included review of current medical information, gathering information related to past medical, social and functional history, completion of standardized testing, formal and informal observation of tasks, assessment of data and development of plan of care and goals.   Treatment time included skilled education and facilitation of tasks to increase safety and independence with ADL's for improved functional independence and quality of life. Discharge Recommendations:  SNF versus Home with Home health OT and 24 hour supervision, Patient would benefit from continued therapy after discharge, Continue to assess pending progress(Family wants to take pt home but pt would benefit from further skilled OT services in a rehab facility to increase safety, decrease fall risk and increase activity tolerance for participation in ADLs and mobility.)    Patient Education:  OT Education: OT Role, Plan of Care  Patient Education: safety with transfers, breathing tech    Equipment Recommendations:  Equipment Needed: No    Plan:  Times per week: 5x  Current Treatment Recommendations: Strengthening, Endurance Training, Patient/Caregiver Education & Training, Self-Care / ADL, Balance Training, Functional Mobility Training, Safety Education & Training. See long-term goal time frame for expected duration of plan of care. If no long-term goals established, a short length of stay is anticipated. Goals:  Patient goals : have les pain in head  Short term goals  Time Frame for Short term goals: by discharge  Short term goal 1: Pt to navigate to/rom bathroom using aD with CGA and min cues for safety to increase her ability to complete ADL tasks in bathroom at home  Short term goal 2: Pt to complete UB ADL tasks with min A and LB with mod A `  Short term goal 3: pt to complte toileting tasks and tranfser with CGA and min cues  Short term goal 4: Pt to icnrease activity tolerance to > 20 min with 1-2 rest breaks to complete her ADL routine         Following session, patient left in safe position with all fall risk precautions in place.

## 2021-01-19 NOTE — CARE COORDINATION
1/19/21, 1:45 PM EST    DISCHARGE PLANNING EVALUATION      Spoke with RN, who shares that family is considering ecf, inquiring about [facility]. Call made to [facility], however, facility does not have beds open at this time.

## 2021-01-20 VITALS
BODY MASS INDEX: 31.9 KG/M2 | DIASTOLIC BLOOD PRESSURE: 80 MMHG | RESPIRATION RATE: 18 BRPM | SYSTOLIC BLOOD PRESSURE: 142 MMHG | HEART RATE: 80 BPM | TEMPERATURE: 98.6 F | HEIGHT: 68 IN | WEIGHT: 210.5 LBS | OXYGEN SATURATION: 90 %

## 2021-01-20 LAB — SARS-COV-2, PCR: NOT DETECTED

## 2021-01-20 PROCEDURE — 97535 SELF CARE MNGMENT TRAINING: CPT

## 2021-01-20 PROCEDURE — 97530 THERAPEUTIC ACTIVITIES: CPT

## 2021-01-20 PROCEDURE — 6370000000 HC RX 637 (ALT 250 FOR IP): Performed by: PHYSICIAN ASSISTANT

## 2021-01-20 PROCEDURE — 92526 ORAL FUNCTION THERAPY: CPT

## 2021-01-20 PROCEDURE — U0002 COVID-19 LAB TEST NON-CDC: HCPCS

## 2021-01-20 PROCEDURE — 2580000003 HC RX 258: Performed by: PHYSICIAN ASSISTANT

## 2021-01-20 PROCEDURE — 94760 N-INVAS EAR/PLS OXIMETRY 1: CPT

## 2021-01-20 RX ADMIN — HYDROCODONE BITARTRATE AND ACETAMINOPHEN 1 TABLET: 5; 325 TABLET ORAL at 07:49

## 2021-01-20 RX ADMIN — METOPROLOL TARTRATE 50 MG: 50 TABLET, FILM COATED ORAL at 07:47

## 2021-01-20 RX ADMIN — NALOXEGOL OXALATE 25 MG: 25 TABLET, FILM COATED ORAL at 07:48

## 2021-01-20 RX ADMIN — HYDRALAZINE HYDROCHLORIDE 25 MG: 25 TABLET, FILM COATED ORAL at 07:47

## 2021-01-20 RX ADMIN — HYDROCODONE BITARTRATE AND ACETAMINOPHEN 1 TABLET: 5; 325 TABLET ORAL at 11:45

## 2021-01-20 RX ADMIN — SODIUM CHLORIDE, PRESERVATIVE FREE 10 ML: 5 INJECTION INTRAVENOUS at 10:04

## 2021-01-20 RX ADMIN — VALSARTAN 80 MG: 80 TABLET, FILM COATED ORAL at 07:47

## 2021-01-20 RX ADMIN — HYDROCHLOROTHIAZIDE 12.5 MG: 12.5 CAPSULE ORAL at 07:47

## 2021-01-20 RX ADMIN — TIZANIDINE 2 MG: 4 TABLET ORAL at 14:05

## 2021-01-20 RX ADMIN — HYDRALAZINE HYDROCHLORIDE 25 MG: 25 TABLET, FILM COATED ORAL at 03:38

## 2021-01-20 NOTE — H&P
[address] [place] [id number]                              HISTORY AND PHYSICAL    PATIENT NAME: Angelika Gallegos                       :        10/24/1930  MED REC NO:   [id number]                           ROOM:       0011  ACCOUNT NO:   [de-identified]                           ADMIT DATE: 2021  PROVIDER:     Mikey Sethi PA-C    CHIEF COMPLAINT:  Cervical pain, status post fall on 2021. HISTORY OF PRESENT ILLNESS:  The patient is a 80-year-old female who we  were consulted on at [facility] and transferred to [facility] on 2021 due to a C2 type III dens fracture. She is a patient who has a past medical history significant for AFib,  essential hypertension, and a CVA roughly 15 months ago and she does  have a poor short-term memory and which makes her a poor historian. She  was rounded in early hours on 2021 and confusion was noted, in  which I called and spoke with her  and son regarding the  situation, in which they provided the history for the patient. Per her  , she fell when she was in the bathroom on 2021. It was an  unsupervised fall and then she went to [facility] due to  cervical pain, hematoma of her scalp and face, and was worked up with a  CT head, CT facial as well as a CT cervical spine, in which did  demonstrate a comminuted C2 dens fracture, which is a type 3. The  emergency department physician at [facility] then  contacted Dr. Angelica Ritter and then the patient was transferred to [facility]. Due to the poor historian, the patient denied any  radicular symptoms or weakness in the upper or lower extremities. She  was moving her arms and legs without significant difficulties and she  was in a [facility].   She denied any fever, fatigue, chills, nausea, vomiting, diarrhea, or headache. After discussion with her  and  son over the phone, the patient does ambulate with the baseline of a  walker with wheels due to low back pain and she ambulates in a slow  flexed forward position. She does have a past surgical history  significant for bilateral knee replacements and she currently lives in a  home with her , in which they are planning on moving into an  assisted-living ECF here soon. She denies a change in her bowel or  bladder control. ALLERGIES:  No known drug allergies. CURRENT MEDICATIONS:  Include Phenergan, Zofran, GlycoLax, Tylenol,  morphine, Norco, Zanaflex, Eliquis, aspirin, atorvastatin, metoprolol,  Movantik, losartan, hydrochlorothiazide, DuoNeb, and hydralazine. PAST MEDICAL HISTORY:  Significant for AFib, diabetes, hypertension,  cerebral artery occlusion, right cerebral infarction, breast cancer,  arthritis, history of blood clots and hyperlipidemia. PAST SURGICAL HISTORY:  Significant for a left mastectomy, right hip  replacement, bilateral knee replacements and breast surgery. SOCIAL HISTORY:  She currently lives with her  in a house and is  a nonsmoker. REVIEW OF SYSTEMS:  Negative unless otherwise stated in HPI. PHYSICAL EXAMINATION:  GENERAL:  The patient is calm, cooperative, alert and oriented x0. She  was not oriented to date, place or person, but she is resting  comfortably with the C-collar in place, in no acute distress. CERVICAL SPINE:  Demonstrates skin is warm, dry, and intact with no open  wounds or lesions. She expressed mild tenderness to palpation in her  upper cervical spine. MUSCULOSKELETAL:  Examination of bilateral upper extremities demonstrate  skin is warm, dry, and intact with no open wounds or lesions. Distal  pulses are +2 at the wrist bilaterally. Sensation is intact light  touch.   Strength is maintained 5/5 in bilateral , finger extension, patient. She is a patient who fell in the bathroom on 01/16/2021 that  was unsupervised. She then went to [facility] due to her fall and noted to have an hematoma and abrasion on  her right eye and was worked up with CT head, CT facial and CT cervical  spine, which demonstrated a type III dens fracture and then was  consulted to our team, Dr. Emiliana Hough, for further evaluation, in which she  was transferred to [address] [place] [address] for continued monitoring  and care. We have reviewed her CT cervical spine demonstrated a  comminuted, but not significantly displaced type III dens fracture. Our  recommendation at this time is to continue her cervical spine collar at  all times and to begin ambulating with therapy and staff. She is at a  fall risk, in which we will repeat her CT cervical spine without  contrast in roughly 1 week to verify the fracture is not displacing  more. There is no surgical intervention planned at this time.         Syeda Lugo [place]    D: 01/18/2021 18:50:38       T: 01/18/2021 18:56:51     [email or web address]  Job#: [id number]     Doc#: [id number]    CC:

## 2021-01-20 NOTE — PROGRESS NOTES
care.    BALANCE:  Sitting Balance:  Stand By Assistance. Standing Balance: Contact Guard Assistance. BED MOBILITY:  Not Tested    TRANSFERS:  Stand to Sit: Contact Guard Assistance. Sit to Stand: Contact Guard Assistance    FUNCTIONAL MOBILITY:  Assistive Device: Rolling Walker  Assist Level:  Contact Guard Assistance. Distance: To and from bathroom  Slow and steady pace with positive encouragement. Patient occasionally yelling out with cervical pain with any movement. ADDITIONAL ACTIVITIES:  Pt. Completed static standing at walker with CGA. Pt. Completed unilateral reaching over FCO briefly due to fear of increasing cervical discomfort x 2 trials. Spouse and daughter arrived at end of session and educated on pt. Therapy session. ASSESSMENT:     Activity Tolerance:  Patient tolerance of  treatment: fair. Discharge Recommendations: Home with Home health OT, Patient would benefit from continued therapy after discharge, Continue to assess pending progress(FAmily would like to pt home. Pt would benefit from further skilled OT services in rehab facility to decrease fall risk, increase safety and increase activity tolerance for ADLs and mobility)   Equipment Recommendations: Equipment Needed: No  Plan: Times per week: 5x  Current Treatment Recommendations: Strengthening, Endurance Training, Patient/Caregiver Education & Training, Self-Care / ADL, Balance Training, Functional Mobility Training, Safety Education & Training    Patient Education  Patient Education: Plan of Care, ADL's, Importance of Increasing Activity and Assistive Device Safety and safety with functional mobility and transfers.      Goals  Short term goals  Time Frame for Short term goals: by discharge  Short term goal 1: Pt to navigate to/rom bathroom using aD with CGA and min cues for safety to increase her ability to complete ADL tasks in bathroom at home  Short term goal 2: Pt to complete UB ADL tasks with min A and LB with mod A `  Short term goal 3: pt to complte toileting tasks and tranfser with CGA and min cues  Short term goal 4: Pt to icnrease activity tolerance to > 20 min with 1-2 rest breaks to complete her ADL routine    Following session, patient left in safe position with all fall risk precautions in place.

## 2021-01-20 NOTE — PROGRESS NOTES
[facility]  INPATIENT SPEECH THERAPY  [facility]  DAILY NOTE    TIME   SLP Individual Minutes  Time In: 1001  Time Out: [facility]  Minutes: 15  Timed Code Treatment Minutes: 0 Minutes       Date: 2021  Patient Name: Jannie Montero      CSN: [id number]   : 10/24/1930  (80 y.o.)  Gender: female   Referring Physician: Bharathi Michele PA-C  Diagnosis: Compression fracture of C2  Secondary Diagnosis: Dysphagia  Precautions: Aspiration  Current Diet: Soft and Bite-Size and Thin Liquids  Swallowing Strategies: Standard Universal Swallow Precautions  Date of Last MBS: Not Applicable    Pain:  No pain reported. Subjective:  RN, Ricky Hamilton, approved session this date. Upon arrival to room, pt sitting upright in recliner, awake. Pt was agreeable to participate in skilled dysphagia tx this date. Pt was alert, pleasant, and cooperative throughout session. No family present during dysphagia tx; however,  arrived after session. ST updated  of progress with dysphagia tx this date and continued diet recommendations and swallow strategies. Short-Term Goals:  SHORT TERM GOAL #1:  Goal 1: Pt will consume a soft and bite-size diet and thin liquids without overt s/s of aspiration, adequate mastication, timely bolus formation, and adequate endurace for safe meal consumption  INTERVENTIONS:  ST reviewed recommended swallow strategies with pt this date (small bites/sips, slow rate, alternation of bites/sips, upright position, limit distractions, discontinue po intake if fatigued, and 1:1 assistance). Pt was unable to independently recall recommended swallow strategies; however, when provided a multiple choice cue in a FO2, pt independently identified 100% of recommended swallow strategies and carried over swallow strategies with min cues from ST this date.      With soft texture (flor cracker), pt demonstrated evidence of good bolus control, extended mastication, good bolus formation, timely ap Education: [employer] understanding, Demonstrates with assistance and Needs further instruction    ASSESSMENT/PLAN:  Activity Tolerance:  Patient tolerance of  treatment: good. Attentive to task and great participation throughout. Assessment/Plan: Patient progressing toward established goals. Continues to require skilled care of licensed speech pathologist to progress toward achievement of established goals and plan of care. .     Plan for Next Session: Advanced po trials, review of swallow strategies.       Haritha Hines M.A., [address]

## 2021-01-20 NOTE — PROGRESS NOTES
Calling report to [facility], spoke to [employer]. 1450 patient transported to [facility], by [facility] in good condition.  and daughter are here, they will go to the facility this afternoon.

## 2021-01-20 NOTE — DISCHARGE INSTR - COC
Continuity of Care Form    Patient Name: Tank Corona   :    MRN:  [id number]    Admit date:  2021  Discharge date:  21    Code Status Order: Full Code   Advance Directives:   Advance Care Flowsheet Documentation       Date/Time Healthcare Directive Type of Healthcare Directive Copy in [address] Agent's Name Healthcare Agent's Phone Number    21 1308  Yes, patient has an advance directive for healthcare treatment  --  --  --  --  --    21 0201  Yes, patient has an advance directive for healthcare treatment  Durable power of  for health care;Living will  No, copy requested from family  Spouse  Juan Narvaez  [phone]    21 0158  No, patient does not have an advance directive for healthcare treatment  --  --  --  --  --            Admitting Physician:  João Greene MD  PCP: Jose David Kaur MD    Discharging Nurse: [facility]/Room#: 7K-11/011-A  Discharging Unit Phone Number: [phone]    Emergency Contact:   Extended Emergency Contact Information  [facility]  Address: [address] [place] [address] Phone: [phone]  Mobile Phone: [phone]  Relation: Spouse   needed? No    Past Surgical History:  Past Surgical History:   Procedure Laterality Date    BREAST SURGERY      JOINT REPLACEMENT      right hip    JOINT REPLACEMENT      bilateral knees    MASTECTOMY Left        Immunization History: There is no immunization history on file for this patient.     Active Problems:  Patient Active Problem List   Diagnosis Code    Compression fracture of second cervical vertebra (HCC) S12.190A       Isolation/Infection:   Isolation            No Isolation          Patient Infection Status       Infection Onset Added Last Indicated Last Indicated By Review Planned Expiration Resolved Resolved By    COVID-19 Rule Out 01/20/21 01/20/21 01/20/21 COVID-19 (Ordered) 01/27/21 02/03/21              Nurse Assessment:  Last Vital Signs: BP (!) 142/80   Pulse 80   Temp 98.6 °F (37 °C) (Oral)   Resp 18   Ht 5' 8\" (1.727 m)   Wt 210 lb 8 oz (95.5 kg)   SpO2 92%   BMI 32.01 kg/m²     Last documented pain score (0-10 scale): Pain Level: 4  Last Weight:   Wt Readings from Last 1 Encounters:   01/18/21 210 lb 8 oz (95.5 kg)     Mental Status:  oriented    IV Access:  - None    Nursing Mobility/ADLs:  Walking   Assisted  Transfer  Assisted  Bathing  Assisted  Dressing  Assisted  Toileting  Assisted  Feeding  Independent  Med Admin  Assisted  Med Delivery   whole    Wound Care Documentation and Therapy:  Wound 01/18/21 Buttocks deep tissue injury with discoloration (Active)   Wound Etiology Deep tissue/Injury 01/20/21 0325   Dressing/Treatment Open to air 01/20/21 0745   Wound Assessment Purple/maroon 01/20/21 0745   Radha-wound Assessment Non-blanchable erythema 01/20/21 0745   Number of days: 2       Wound 01/18/21 Jaw stage 1 nonblanchable redness (Active)   Wound Etiology Pressure Stage  1 01/18/21 0133   Dressing/Treatment Open to air 01/20/21 0745   Wound Assessment Non-blanchable erythema 01/18/21 1630   Radha-wound Assessment Blanchable erythema 01/18/21 1630   Number of days: 2       Wound 01/18/21 Sternum Right;Upper nonblanchable redness (Active)   Wound Etiology Pressure Stage  1 01/20/21 0325   Wound Assessment Non-blanchable erythema 01/20/21 0745   Radha-wound Assessment Blanchable erythema 01/18/21 1630   Number of days: 2        Elimination:  Continence: Bowel: Yes  Bladder: Yes  Urinary Catheter: None   Colostomy/Ileostomy/Ileal Conduit: No       Date of Last BM: 1/19/21    Intake/Output Summary (Last 24 hours) at 1/20/2021 1108  Last data filed at 1/20/2021 0444  Gross per 24 hour   Intake 400 ml   Output 850 ml   Net -450 ml     I/O last 3 completed shifts:   In: 500 [P.O.:500]  Out: 850 [Urine:850]    Safety Concerns: None    Impairments/Disabilities:      None    Nutrition Therapy:  Current Nutrition Therapy:     Routes of Feeding: Oral, Dysphagia, soft and bite sized  Upright Position with ALL PO intake, 1:1 assistance with ALL PO intake, small bites/sips, keep C-collar on during po intake. Liquids: no restrictions  Daily Fluid Restriction: no  Last Modified Barium Swallow with Video (Video Swallowing Test): not done    Treatments at the Time of Hospital Discharge:   Respiratory Treatments: none  Oxygen Therapy:  is not on home oxygen therapy. Ventilator:    - No ventilator support    Rehab Therapies: Physical Therapy, Occupational therapy,  Weight Bearing Status/Restrictions: No weight bearing restirctions  Other Medical Equipment (for information only, NOT a DME order): Walker, gait belt  Other Treatments: Surgical dressing: Change foam dressing applied to Sacrum every 3 to 7 days.   Peel back every shift to assess the skin and resecure    Patient's personal belongings (please select all that are sent with patient):  {CHP DME Belongings:[id number]}    RN SIGNATURE:  {Esignature:[id number]}    CASE MANAGEMENT/SOCIAL WORK SECTION    Inpatient Status Date: ***    Readmission Risk Assessment Score:  Readmission Risk              Risk of Unplanned Readmission:        7           Discharging to Facility/ Agency   Name: [facility]  Address: [address] [place] [address]  Phone: [phone]  Fax: [phone]    Dialysis Facility (if applicable)   Name:  Address:  Dialysis Schedule:  Phone:  Fax:    / signature: Electronically signed by KEVIN Meng on 1/20/2021 at 11:10 AM     PHYSICIAN SECTION    Prognosis: Good    Condition at Discharge: Stable    Rehab Potential (if transferring to Rehab): Good    Recommended Labs or Other Treatments After Discharge: none    Physician Certification: I certify the above information and transfer of Masood Nunes  is necessary for the continuing treatment of the diagnosis listed and that she requires [place] for greater 30 days.      Update Admission H&P: No change in H&P    PHYSICIAN SIGNATURE:  .esElectronically signed by Anuja Velez MD on 1/20/21 at 1:39 PM EST

## 2021-01-20 NOTE — PROGRESS NOTES
Department of Orthopedic Surgery  Spine Service  Progress Note        Subjective:   [birth year]  Cj Mansfield is resting in bed with C-Collar in place. Evaluated by myself and Dr. Trula Galeazzi and discussed with patients  and son regarding plan of treatment. Will continue C-Collar. Begin working with PT/OT to determine if patient cleared to go home. If ok to go home, will discharge today. Have patient ambulate and sit up for meal. With meals, please take off front of collar to avoid choking hazard. Will f/u with the patient in the OP to repeat CT CS. Neuro intact. Patient does have difficulty with short term memory. 9/08/14  Umu is resting in bed. She currently reports no pain. After recommendations from PT/OT considering ECF placement. Social work and Case Management assisting with placement.      Vitals  VITALS:  BP (!) 177/78   Pulse 78   Temp 98.1 °F (36.7 °C) (Oral)   Resp 18   Ht 5' 8\" (1.727 m)   Wt 210 lb 8 oz (95.5 kg)   SpO2 90%   BMI 32.01 kg/m²   24HR INTAKE/OUTPUT:      Intake/Output Summary (Last 24 hours) at 1/20/2021 0706  Last data filed at 1/20/2021 0444  Gross per 24 hour   Intake 500 ml   Output 850 ml   Net -350 ml     URINARY CATHETER OUTPUT (Llanes):  Urethral Catheter-Output (mL): 0 mL  External Urinary Catheter-Output (mL): 500 mL  DRAIN/TUBE OUTPUT:     VENT SETTINGS:  Vent Information  SpO2: 90 %  Additional Respiratory  Assessments  Pulse: 78  Resp: 18  SpO2: 90 %        PHYSICAL EXAM:    Orientation:  Not alert and oriented to person, place and time    Upper Extremity Motor :  deltoids/biceps/triceps/wirst flexion/wrist extension/finger flexion/finger extension 5/5 bilaterally  Upper Extremity Sensory:  Intact C3-T1 distribution    Lower Extremity Motor :  quadriceps, extensor hallucis longus, dorsiflexion, plantarflexion 5/5 bilaterally  Lower Extremity Sensory:  Intact L1-S1    ABNORMAL EXAM FINDINGS:  none    LABS:  Recent Labs     01/19/21  0511   HGB 11.8*   HCT 37.9 ASSESSMENT AND PLAN:    Comminuted C2 L lateral mass fx, stable    1:  Monitor labs  2:  Activity Level:  OOB with therapy and staff.  PT/OT eval to verify if patient able to be safely discharge  3:  Pain Control:  controlled  4:  Discharge Planning:  PT pooja recommends ECF, working on placement    Electronically signed by Zevist DREAD Lamar on 1/20/2021 at 7:06 AM

## 2021-01-20 NOTE — PROGRESS NOTES
[address] [address]  INPATIENT PHYSICAL THERAPY  DAILY NOTE  [facility] ORTHOPEDICS 7K - 7K-11/011-A    Time In: 9318  Time Out: 0840  Timed Code Treatment Minutes: 30 Minutes  Minutes: 30          Date: 2021  Patient Name: Facundo Olmos,  Gender:  female        MRN: [id number]  : 10/24/1930  (80 y.o.)     Referring Practitioner: Yadi Kirkland PA-C  Diagnosis: compression fracture of C2 vertebra, initial encounter  Additional Pertinent Hx: Per chart review: Ranee Phalen y.o. female who we are asked to see/evaluate by Anuja Velez MD for medical management of atrial fibrillation, essential hypertension, and CVA. Patient transferred from [facility] with a C2 compression fracture that occurred as a result of a mechanical fall in the bathroom at home. The patient lives independently with her  of 59 years at home. The patient's  dose advise she had a stroke about one year ago and has significant memory issues. Patient denies any upper extremity pain or weakness, no other symptoms at this time. Prior Level of Function:  Lives With: Spouse  Type of Home: House  Home Layout: One level  Home Access: Stairs to enter without rails  Entrance Stairs - Number of Steps: 1  Home Equipment: Rolling walker, 4 wheeled walker, Cane   Bathroom Toilet: Standard(with BSC placed over toilet)  Bathroom Equipment: 3-in-1 commode  Bathroom Accessibility: Accessible    ADL Assistance: Needs assistance  Homemaking Assistance: Needs assistance  Ambulation Assistance: Independent  Transfer Assistance: Independent  Additional Comments: reports being indep with mobility. uses RW for all household ambulation.  performs most cooking and cleaning and assists with dressing.  Pt indep with bathing    Restrictions/Precautions:  Restrictions/Precautions: General Precautions  Required Braces or Orthoses  Cervical: c-collar  Position Activity Restriction  Spinal Precautions: No Bending, No Lifting, No Twisting  Other position/activity restrictions: Comminuted type III dens fracture, not significantly displaced. SUBJECTIVE: RN approved PT session. Pt in supine upon entry and was agreeable to PT interventions with encouragement. Pt notes increased head pain with being upright, yelling out at times with all movements. Pt sensitive to light touch at left lateral side of head/ear/face. Post session, pt in bedside chair with all needs in reach. Chair alarm on. RN aware. PAIN: head pain 12/10, RN aware    OBJECTIVE:  Bed Mobility:  Rolling to Right: Maximum Assistance   Supine to Sit: Minimal Assistance, with head of bed raised, with verbal cues , with increased time for completion, pt requires assist for LE mgmt    Transfers:  Sit to Stand: Minimal Assistance, with increased time for completion, cues for hand placement, to/from chair with arms, 1-2 A  Stand to [place] [address] [address], with increased time for completion, cues for hand placement, to/from chair with arms    Ambulation:  Contact Guard Assistance, with cues for safety, with verbal cues , with increased time for completion  Distance: 2+3  Surface: Level Tile  Device:Rolling Walker  Gait Deviations: Forward Flexed Posture, Slow Quiana, Decreased Step Length Bilaterally and Decreased Weight Shift Bilaterally  Pt yelling out in pain at her head with limited mobility. Pt encouraged to complete deep breathing throughout transfers- which assisted in pain mgmt. Balance:  Static Standing Balance: Contact Guard Assistance  Dynamic Standing Balance: Contact Guard Assistance      Functional Outcome Measures: Completed  AM-PAC Inpatient Mobility without Stair Climbing Raw Score : 13  AM-PAC Inpatient without Stair Climbing T-Scale Score : 38.96    ASSESSMENT:  Assessment: Patient progressing toward established goals. Increased time spent reviewing collar wear and spinal precautions.    Activity Tolerance:  Patient tolerance of  treatment: fair- pain

## 2021-01-20 NOTE — CARE COORDINATION
1/20/21, 8:06 AM EST    DISCHARGE PLANNING EVALUATION       Spoke with [facility] admissions. Facility will have a bed for patient at discharge, will need negative covid test within 48 hrs of discharge. Spoke with , who is requesting Rafa Givens at discharge. Updated RN.

## 2021-02-06 NOTE — DISCHARGE SUMMARY
[address] [place] [address]                               DISCHARGE SUMMARY    PATIENT NAME: Angel Villasenor                       :        10/24/1930  MED REC NO:   [id number]                           ROOM:       [id number]  ACCOUNT NO:   [de-identified]                           ADMIT DATE: 2021  PROVIDER:     Shahana To PA-C                    [address] [place] [address] DATE: 2021    DISCHARGE DIAGNOSIS:  Type III dens fracture C2, status post fall  2021. HOSPITAL COURSE:  The patient is a 80-year-old female who presented to  the hospital at [address] [place] [address] on the date of 2021 as a  transfer from [facility].  She is an individual who was  identified as having a type III C2 odontoid fracture. She took a fall  back on 2021 which was an unwitnessed fall in her bathroom. She  was evaluated in [facility] and found to have this  fracture and she was transferred to [address] [place] [address] for  further care under our watch. She was placed in a cervical collar with  plan for continued conservative care with the use of semirigid  immobilization and the plan to follow up as an outpatient with serial CT  scanning to confirm if there is no further displacement of the fracture. She stayed in the hospital from 2021 and worked with Physical  Therapy for several days and the plans were made then for her to be able  to be discharged to [facility] for continued rehab  and care for the next several weeks as she is recovering from this  fracture. She was able to be discharged home on the date of 2021. She was fully neurologically intact at discharge. DISCHARGE MEDICATIONS:  Include Norco and Zanaflex. DISCHARGE INSTRUCTIONS:  Include continued use of the cervical collar at  all times. No removing of the cervical collar at any time.   She will continue to work slowly with Physical Therapy and follow with us on a  short-term basis with repeat CT scanning of the cervical spine for  evaluation of this fracture. If there are any further signs of  displacement, consideration could be given to further rigid  immobilization versus possible surgical management if necessary or if  she shows any signs of neurologic deficit, but for now, she did show  that she was neurologically intact and maintained good alignment of this  C2 fracture. DISCHARGE DISPOSITION:  To an external care facility. DISCHARGE CONDITION:  Stable.         Marc Felton    D: 02/05/2021 6:51:41       T: 02/05/2021 7:55:20     [email or web address]  Job#: [id number]     Doc#: [id number]    CC:  Yanet Wilder Md